# Patient Record
Sex: MALE | Race: WHITE | NOT HISPANIC OR LATINO | Employment: OTHER | URBAN - METROPOLITAN AREA
[De-identification: names, ages, dates, MRNs, and addresses within clinical notes are randomized per-mention and may not be internally consistent; named-entity substitution may affect disease eponyms.]

---

## 2021-01-01 ENCOUNTER — HOSPITAL ENCOUNTER (EMERGENCY)
Facility: HOSPITAL | Age: 76
End: 2021-02-21
Admitting: EMERGENCY MEDICINE
Payer: COMMERCIAL

## 2021-01-01 ENCOUNTER — APPOINTMENT (EMERGENCY)
Dept: RADIOLOGY | Facility: HOSPITAL | Age: 76
End: 2021-01-01
Payer: COMMERCIAL

## 2021-01-01 VITALS
OXYGEN SATURATION: 96 % | RESPIRATION RATE: 14 BRPM | HEART RATE: 62 BPM | DIASTOLIC BLOOD PRESSURE: 64 MMHG | SYSTOLIC BLOOD PRESSURE: 117 MMHG

## 2021-01-01 DIAGNOSIS — I21.9 ACUTE MI (HCC): ICD-10-CM

## 2021-01-01 DIAGNOSIS — I46.9 CARDIAC ARREST (HCC): Primary | ICD-10-CM

## 2021-01-01 LAB
ALBUMIN SERPL BCP-MCNC: 2.5 G/DL (ref 3.5–5)
ALP SERPL-CCNC: 87 U/L (ref 46–116)
ALT SERPL W P-5'-P-CCNC: 2059 U/L (ref 12–78)
ANION GAP SERPL CALCULATED.3IONS-SCNC: 16 MMOL/L (ref 4–13)
APTT PPP: 36 SECONDS (ref 23–37)
AST SERPL W P-5'-P-CCNC: 1663 U/L (ref 5–45)
BASOPHILS NFR MAR MANUAL: 0 % (ref 0–1)
BILIRUB SERPL-MCNC: 0.5 MG/DL (ref 0.2–1)
BUN SERPL-MCNC: 33 MG/DL (ref 5–25)
CA-I BLD-SCNC: 0.93 MMOL/L (ref 1.12–1.32)
CALCIUM ALBUM COR SERPL-MCNC: 9.4 MG/DL (ref 8.3–10.1)
CALCIUM SERPL-MCNC: 8.2 MG/DL (ref 8.3–10.1)
CHLORIDE SERPL-SCNC: 99 MMOL/L (ref 100–108)
CO2 SERPL-SCNC: 28 MMOL/L (ref 21–32)
CREAT SERPL-MCNC: 2.55 MG/DL (ref 0.6–1.3)
EOSINOPHIL NFR BLD MANUAL: 0 % (ref 0–6)
ERYTHROCYTE [DISTWIDTH] IN BLOOD BY AUTOMATED COUNT: 12.3 % (ref 11.6–15.1)
GFR SERPL CREATININE-BSD FRML MDRD: 14 ML/MIN/1.73SQ M
GLUCOSE SERPL-MCNC: 208 MG/DL (ref 65–140)
GLUCOSE SERPL-MCNC: 224 MG/DL (ref 65–140)
GLUCOSE SERPL-MCNC: 238 MG/DL (ref 65–140)
HCT VFR BLD AUTO: 44.5 % (ref 36.5–49.3)
HCT VFR BLD CALC: 42 % (ref 36.5–49.3)
HGB BLD-MCNC: 13.1 G/DL (ref 12–17)
HGB BLDA-MCNC: 14.3 G/DL (ref 12–17)
INR PPP: 1.91 (ref 0.84–1.19)
LACTATE SERPL-SCNC: 12.4 MMOL/L (ref 0.5–2)
LG PLATELETS BLD QL SMEAR: PRESENT
LYMPHOCYTES # BLD AUTO: 29 % (ref 14–44)
MACROCYTES BLD QL AUTO: PRESENT
MCH RBC QN AUTO: 30.5 PG (ref 26.8–34.3)
MCHC RBC AUTO-ENTMCNC: 29.4 G/DL (ref 31.4–37.4)
MCV RBC AUTO: 104 FL (ref 82–98)
MONOCYTES NFR BLD: 9 % (ref 4–12)
MYELOCYTES NFR BLD MANUAL: 2 % (ref 0–1)
NEUTS BAND NFR BLD MANUAL: 6 % (ref 0–8)
NEUTS SEG NFR BLD AUTO: 51 % (ref 43–75)
NRBC BLD AUTO-RTO: 1 /100 WBC (ref 0–2)
NRBC BLD AUTO-RTO: 1 /100 WBCS
PCO2 BLD: >103 MM HG (ref 42–50)
PH BLD: 6.95 [PH] (ref 7.3–7.4)
PLATELET # BLD AUTO: 135 THOUSANDS/UL (ref 149–390)
PLATELET BLD QL SMEAR: ADEQUATE
PMV BLD AUTO: 10.6 FL (ref 8.9–12.7)
PO2 BLD: 37 MM HG (ref 35–45)
POTASSIUM BLD-SCNC: 5.1 MMOL/L (ref 3.5–5.3)
POTASSIUM SERPL-SCNC: 5.2 MMOL/L (ref 3.5–5.3)
PROT SERPL-MCNC: 6.2 G/DL (ref 6.4–8.2)
PROTHROMBIN TIME: 21.8 SECONDS (ref 11.6–14.5)
RBC # BLD AUTO: 4.29 MILLION/UL (ref 3.88–5.62)
SODIUM BLD-SCNC: 136 MMOL/L (ref 136–145)
SODIUM SERPL-SCNC: 143 MMOL/L (ref 136–145)
SPECIMEN SOURCE: ABNORMAL
TOTAL CELLS COUNTED SPEC: 100
TROPONIN I SERPL-MCNC: 0.48 NG/ML
VARIANT LYMPHS # BLD AUTO: 3 %
WBC # BLD AUTO: 11.38 THOUSAND/UL (ref 4.31–10.16)

## 2021-01-01 PROCEDURE — 85007 BL SMEAR W/DIFF WBC COUNT: CPT | Performed by: PHYSICIAN ASSISTANT

## 2021-01-01 PROCEDURE — 82803 BLOOD GASES ANY COMBINATION: CPT

## 2021-01-01 PROCEDURE — 94002 VENT MGMT INPAT INIT DAY: CPT

## 2021-01-01 PROCEDURE — 36415 COLL VENOUS BLD VENIPUNCTURE: CPT | Performed by: PHYSICIAN ASSISTANT

## 2021-01-01 PROCEDURE — U0005 INFEC AGEN DETEC AMPLI PROBE: HCPCS | Performed by: EMERGENCY MEDICINE

## 2021-01-01 PROCEDURE — 85014 HEMATOCRIT: CPT

## 2021-01-01 PROCEDURE — 80053 COMPREHEN METABOLIC PANEL: CPT | Performed by: PHYSICIAN ASSISTANT

## 2021-01-01 PROCEDURE — 31500 INSERT EMERGENCY AIRWAY: CPT | Performed by: EMERGENCY MEDICINE

## 2021-01-01 PROCEDURE — 96375 TX/PRO/DX INJ NEW DRUG ADDON: CPT

## 2021-01-01 PROCEDURE — 82330 ASSAY OF CALCIUM: CPT

## 2021-01-01 PROCEDURE — 82947 ASSAY GLUCOSE BLOOD QUANT: CPT

## 2021-01-01 PROCEDURE — 94760 N-INVAS EAR/PLS OXIMETRY 1: CPT

## 2021-01-01 PROCEDURE — 99285 EMERGENCY DEPT VISIT HI MDM: CPT

## 2021-01-01 PROCEDURE — 96376 TX/PRO/DX INJ SAME DRUG ADON: CPT

## 2021-01-01 PROCEDURE — 84295 ASSAY OF SERUM SODIUM: CPT

## 2021-01-01 PROCEDURE — 84132 ASSAY OF SERUM POTASSIUM: CPT

## 2021-01-01 PROCEDURE — 96374 THER/PROPH/DIAG INJ IV PUSH: CPT

## 2021-01-01 PROCEDURE — 85027 COMPLETE CBC AUTOMATED: CPT | Performed by: PHYSICIAN ASSISTANT

## 2021-01-01 PROCEDURE — 85610 PROTHROMBIN TIME: CPT | Performed by: PHYSICIAN ASSISTANT

## 2021-01-01 PROCEDURE — 71045 X-RAY EXAM CHEST 1 VIEW: CPT

## 2021-01-01 PROCEDURE — 99291 CRITICAL CARE FIRST HOUR: CPT | Performed by: EMERGENCY MEDICINE

## 2021-01-01 PROCEDURE — 83605 ASSAY OF LACTIC ACID: CPT | Performed by: EMERGENCY MEDICINE

## 2021-01-01 PROCEDURE — 82948 REAGENT STRIP/BLOOD GLUCOSE: CPT

## 2021-01-01 PROCEDURE — 85730 THROMBOPLASTIN TIME PARTIAL: CPT | Performed by: PHYSICIAN ASSISTANT

## 2021-01-01 PROCEDURE — U0003 INFECTIOUS AGENT DETECTION BY NUCLEIC ACID (DNA OR RNA); SEVERE ACUTE RESPIRATORY SYNDROME CORONAVIRUS 2 (SARS-COV-2) (CORONAVIRUS DISEASE [COVID-19]), AMPLIFIED PROBE TECHNIQUE, MAKING USE OF HIGH THROUGHPUT TECHNOLOGIES AS DESCRIBED BY CMS-2020-01-R: HCPCS | Performed by: EMERGENCY MEDICINE

## 2021-01-01 PROCEDURE — 84484 ASSAY OF TROPONIN QUANT: CPT | Performed by: PHYSICIAN ASSISTANT

## 2021-01-01 PROCEDURE — 92950 HEART/LUNG RESUSCITATION CPR: CPT | Performed by: EMERGENCY MEDICINE

## 2021-01-01 RX ORDER — EPINEPHRINE 0.1 MG/ML
SYRINGE (ML) INJECTION CODE/TRAUMA/SEDATION MEDICATION
Status: COMPLETED | OUTPATIENT
Start: 2021-01-01 | End: 2021-01-01

## 2021-01-01 RX ORDER — AMIODARONE HYDROCHLORIDE 50 MG/ML
INJECTION, SOLUTION INTRAVENOUS CODE/TRAUMA/SEDATION MEDICATION
Status: COMPLETED | OUTPATIENT
Start: 2021-01-01 | End: 2021-01-01

## 2021-01-01 RX ADMIN — EPINEPHRINE 1 MG: 0.1 INJECTION INTRACARDIAC; INTRAVENOUS at 11:19

## 2021-01-01 RX ADMIN — EPINEPHRINE 1 MG: 0.1 INJECTION INTRACARDIAC; INTRAVENOUS at 10:44

## 2021-01-01 RX ADMIN — EPINEPHRINE 1 MG: 0.1 INJECTION INTRACARDIAC; INTRAVENOUS at 10:58

## 2021-01-01 RX ADMIN — AMIODARONE HYDROCHLORIDE 300 MG: 50 INJECTION, SOLUTION INTRAVENOUS at 11:16

## 2021-01-01 RX ADMIN — EPINEPHRINE 1 MG: 0.1 INJECTION INTRACARDIAC; INTRAVENOUS at 10:48

## 2021-02-21 NOTE — ED PROVIDER NOTES
History  Chief Complaint   Patient presents with    Cardiac Arrest     pt arrives intubated s/p cardiac arrest     68-year-old male was BIBA from home after witnessed loss of consciousness after using the toilet  Girlfriend noted that he was breathing heavily but he denied pain and declined any transfer to hospital   Once he became unresponsive she did call 911  She started CPR in the bed  Police arrived and apparently a ED did not advise shock  Continued CPR  Medics arrived found patient in asystole  They gave him several rounds epinephrine intravenously  Supraglottic airway was placed and patient bagged  Lukens chest compressor was deployed  Medics state that patient had 2 brief episodes of organized electrical activity with pulses  When he arrived he was asystolic and apneic  He had pale skin with circumoral cyanosis  Patient had continued compressions and received several rounds of IV epinephrine  He was intubated by me with endotracheal tube with indirect laryngoscopy  Placed on mechanical ventilator  Had a run of V-tach and received amiodarone  He went back to sinus rhythm briefly with good blood pressure  EKG at 11:03 a m  showed sinus rhythm with fusion complexes and premature atrial complexes  He had evidence of ST-elevation and MI in anterior leads with reciprocal changes inferolaterally  This slowly turned bradycardia and then pulseless as we were attempting to arrange transport to critical care at [facility]  Patient to continued to deteriorate over the next 20 minutes  Epinephrine drip was being prepared but it was decided after approximately 2 hours of CPR with poor responsiveness and lack of cardiac activity on bedside ultrasound to withdraw support  Patient was pronounced dead at 11:32 p m  Ivan Mccann Patient's brother and girlfriend came to the hospital afterwards  This state that he has no known medical problems  He did not drink alcohol regularly it was not a smoker  They said that it has been many years since he has been to a doctor  He took multiple OTC supplements including Cardio VH form [employer], DC Formula 217 water pills, CoQ10, Prostate Health, Black Elderberry, Vitamin D3 and OcuSuport     Family states that he had some upper respiratory symptoms for 1 week with sore throat but no fever cough  None       No past medical history on file  No past surgical history on file  No family history on file  I have reviewed and agree with the history as documented  No existing history information found  No existing history information found  Social History     Tobacco Use    Smoking status: Not on file   Substance Use Topics    Alcohol use: Not on file    Drug use: Not on file       Review of Systems   Unable to perform ROS: Patient unresponsive       Physical Exam  Physical Exam  Vitals signs and nursing note reviewed  Constitutional:       General: He is in acute distress  Appearance: He is obese  Comments: Apneic  Asystolic  Pupils fixed and dilated  Supraglottic airway in place  Lukens chest compression device in place  Peripheral IV access in left arm  HENT:      Head: Normocephalic and atraumatic  Right Ear: External ear normal       Left Ear: External ear normal       Nose: Nose normal    Eyes:      Comments: Pupils approximately 4 millimeter and fixed   Neck:      Musculoskeletal: No neck rigidity  Cardiovascular:      Comments: Asystolic  Pulmonary:      Comments: Apneic  Extremely diminished breath sounds with bagging supraglottic airway  Once orally intubated patient symmetric breath sounds  Abdominal:      General: There is distension  Palpations: There is no mass  Hernia: No hernia is present  Musculoskeletal:      Right lower leg: Edema present  Left lower leg: Edema present  Skin:     Coloration: Skin is pale     Neurological:      Comments: Comatose   Psychiatric:      Comments: Unresponsive         Vital Signs  ED Triage Vitals   Temp Pulse Respirations Blood Pressure SpO2   -- 02/21/21 1050 02/21/21 1104 02/21/21 1104 02/21/21 1050    (!) 114 12 (!) 153/106 96 %      Temp src Heart Rate Source Patient Position - Orthostatic VS BP Location FiO2 (%)   -- 02/21/21 1050 -- -- 02/21/21 1101    Monitor   100      Pain Score       --                  Vitals:    02/21/21 1050 02/21/21 1104 02/21/21 1114 02/21/21 1124   BP:  (!) 153/106  117/64   Pulse: (!) 114 91 (!) 109 62         Visual Acuity      ED Medications  Medications   EPINEPHrine (ADRENALIN) injection SOSY (1 mg Intravenous Given 2/21/21 1119)   amiodarone 150 mg/3 mL injection (300 mg Intravenous Given 2/21/21 1116)   EPINEPHrine (ADRENALIN) injection SOSY (1 mg Intravenous Given 2/21/21 1119)       Diagnostic Studies  Results Reviewed     Procedure Component Value Units Date/Time    Novel Coronavirus [facility] [[id number]] Collected: 02/21/21 1337    Lab Status: In process Specimen: Nares from Nasopharyngeal Swab Updated: 02/21/21 1343    CBC and differential [[id number]]  (Abnormal) Collected: 02/21/21 1055    Lab Status: Final result Specimen: Blood from Arm, Left Updated: 02/21/21 1337     WBC 11 38 Thousand/uL      RBC 4 29 Million/uL      Hemoglobin 13 1 g/dL      Hematocrit 44 5 %       fL      MCH 30 5 pg      MCHC 29 4 g/dL      RDW 12 3 %      MPV 10 6 fL      Platelets 949 Thousands/uL      nRBC 1 /100 WBCs     Narrative: This is an appended report  These results have been appended to a previously verified report      Manual Differential(PHLEBS Do Not Order) [[id number]]  (Abnormal) Collected: 02/21/21 1055    Lab Status: Final result Specimen: Blood from Arm, Left Updated: 02/21/21 1337     Segmented % 51 %      Bands % 6 %      Lymphocytes % 29 %      Monocytes % 9 %      Eosinophils, % 0 %      Basophils % 0 %      Myelocytes % 2 %      Atypical Lymphocytes % 3 %      Total Counted 100     nRBC 1 /100 WBC      Macrocytes Present     Platelet Estimate Adequate     Large Platelet Present    Troponin I [[id number]]  (Abnormal) Collected: 02/21/21 1055    Lab Status: Final result Specimen: Blood from Arm, Left Updated: 02/21/21 1200     Troponin I 0 48 ng/mL     POCT Blood Gas (CG8+) [[id number]]  (Abnormal) Collected: 02/21/21 1056    Lab Status: Final result Specimen: Venous Updated: 02/21/21 1141     ph, Jong ISTAT 6 953     pCO2, Jong i-STAT >103 0 mm HG      pO2, Jong i-STAT 37 0 mm HG      BE, i-STAT --     HCO3, Jong i-STAT --     CO2, i-STAT --     O2 Sat, i-STAT --     SODIUM, I-STAT 136 mmol/l      Potassium, i-STAT 5 1 mmol/L      Calcium, Ionized i-STAT 0 93 mmol/L      Hct, i-STAT 42 %      Hgb, i-STAT 14 3 g/dl      Glucose, i-STAT 224 mg/dl      Specimen Type VENOUS    Lactic acid, plasma [[id number]]  (Abnormal) Collected: 02/21/21 1058    Lab Status: Final result Specimen: Blood from Arm, Left Updated: 02/21/21 1140     LACTIC ACID 12 4 mmol/L     Narrative:      Result may be elevated if tourniquet was used during collection      Comprehensive metabolic panel [[id number]]  (Abnormal) Collected: 02/21/21 1055    Lab Status: Final result Specimen: Blood from Arm, Left Updated: 02/21/21 1138     Sodium 143 mmol/L      Potassium 5 2 mmol/L      Chloride 99 mmol/L      CO2 28 mmol/L      ANION GAP 16 mmol/L      BUN 33 mg/dL      Creatinine 2 55 mg/dL      Glucose 238 mg/dL      Calcium 8 2 mg/dL      Corrected Calcium 9 4 mg/dL      AST 1,663 U/L      ALT 2,059 U/L      Alkaline Phosphatase 87 U/L      Total Protein 6 2 g/dL      Albumin 2 5 g/dL      Total Bilirubin 0 50 mg/dL      eGFR 14 ml/min/1 73sq m     Narrative:      Diego guidelines for Chronic Kidney Disease (CKD):     Stage 1 with normal or high GFR (GFR > 90 mL/min/1 73 square meters)    Stage 2 Mild CKD (GFR = 60-89 mL/min/1 73 square meters)    Stage 3A Moderate CKD (GFR = 45-59 mL/min/1 73 square meters)   Stage 3B Moderate CKD (GFR = 30-44 mL/min/1 73 square meters)    Stage 4 Severe CKD (GFR = 15-29 mL/min/1 73 square meters)    Stage 5 End Stage CKD (GFR <15 mL/min/1 73 square meters)  Note: GFR calculation is accurate only with a steady state creatinine    Protime-INR [[id number]]  (Abnormal) Collected: 02/21/21 1055    Lab Status: Final result Specimen: Blood from Arm, Left Updated: 02/21/21 1129     Protime 21 8 seconds      INR 1 91    APTT [[id number]]  (Normal) Collected: 02/21/21 1055    Lab Status: Final result Specimen: Blood from Arm, Left Updated: 02/21/21 1129     PTT 36 seconds     Fingerstick Glucose (POCT) [[id number]]  (Abnormal) Collected: 02/21/21 1056    Lab Status: Final result Updated: 02/21/21 1057     POC Glucose 208 mg/dl                  XR chest 1 view portable    (Results Pending)              Procedures  ECG 12 Lead Documentation Only    Date/Time: 2/21/2021 11:05 AM  Performed by: Tu Leblanc DO  Authorized by: Tu Leblanc DO     ECG reviewed by me, the ED Provider: yes    Patient location:  ED  Previous ECG:     Previous ECG:  Unavailable  Rhythm:     Rhythm: sinus rhythm    Ectopy:     Ectopy: none      Intubation    Date/Time: 2/21/2021 10:50 AM  Performed by: Tu Leblanc DO  Authorized by: Tu Leblanc DO     Patient location:  ED  Other Assisting Provider: Yes (comment) RT Rahel)    Consent:     Consent obtained:  Emergent situation  Universal protocol:     Procedure explained and questions answered to patient or proxy's satisfaction: no      Relevant documents present and verified: no      Test results available and properly labeled: no      Radiology Images displayed and confirmed  If images not available, report reviewed: no    Pre-procedure details:     Patient status:  Unresponsive    ASA status: Supraglottic airway in place      Pretreatment medications:  None    Paralytics:  None  Indications:     Indications for intubation: respiratory failure, airway protection, hypoxemia, hypercapnia and pulmonary toilet    Procedure details:     Preoxygenation:  AJ/LMA    CPR in progress: yes      Intubation method:  Oral    Oral intubation technique:  Glidescope    Laryngoscope blade: Mac 5    Tube size (mm):  8 0    Tube type:  Hi-lo    Number of attempts:  1    Cricoid pressure: no      Tube visualized through cords: yes    Placement assessment:     ETT to teeth:  23    Tube secured with:  ETT mota    Breath sounds:  Equal    Placement verification: chest rise, condensation, CXR verification, direct visualization, equal breath sounds and ETCO2 detector      Chest x-ray findings: At tracheal lisbet  Tube withdrawn 2 cm  Post-procedure details:     Patient tolerance of procedure: Tolerated well, no immediate complications    Complication (if applicable): With true ET tube 2 centimeters after noting it stepped on chest x-ray and auscultating decreased breath sounds on the left    CriticalCare Time  Performed by: Raheem Murray DO  Authorized by: Raheem Murray DO     Critical care provider statement:     Critical care time (minutes):  55    Critical care time was exclusive of:  Separately billable procedures and treating other patients and teaching time    Critical care was necessary to treat or prevent imminent or life-threatening deterioration of the following conditions:  Cardiac failure, respiratory failure and circulatory failure    Critical care was time spent personally by me on the following activities:  Obtaining history from patient or surrogate, development of treatment plan with patient or surrogate, discussions with consultants, evaluation of patient's response to treatment, examination of patient, ordering and review of laboratory studies, ordering and review of radiographic studies, re-evaluation of patient's condition and ventilator management    I assumed direction of critical care for this patient from another provider in my specialty: no ED Course  ED Course as of 1658   Sun 2021   1311 Spoke to the patient's brother and girlfriend  Two other friends were in the room with us  He states that he has not seen a doctor for probably several years  He has no known medical history will never go to the hospital a said  Coronary was contacted  I await his call back  Case discussed with the corner  Corner wants further testing done including COVID swab which I ordered  This week 's case at this time  I will pronounce the patient  see code sheet for ACLS sequences                                  MDM  Number of Diagnoses or Management Options  Acute MI [facility]):   Cardiac arrest [facility]):   Diagnosis management comments: Elderly man with no known history comes in 1 hour after cardiac arrest with CPR  Asystolic on arrival   Continue to perform ACLS interventions for another approximately 1 hour  Discussed case with Critical Care at [facility]  Due to lack myocardial contractility at an this time and with fixed dilated pupils co was terminated patient pronounced dead at 11:39 a m  Linwooddavid Baker Case discussed with jyothi who wants to keep it was a 's case at this time         Amount and/or Complexity of Data Reviewed  Clinical lab tests: ordered and reviewed  Tests in the radiology section of CPT®: ordered and reviewed  Discuss the patient with other providers: yes  Independent visualization of images, tracings, or specimens: yes        Disposition  Final diagnoses:   Cardiac arrest ([address] )   Acute MI ([address] )     Time reflects when diagnosis was documented in both MDM as applicable and the Disposition within this note     Time User Action Codes Description Comment    2021 11:14 AM Homer Marquez Add [I46 9] Cardiac arrest ([address] )     2021 11:14 AM Homer Alma Add [I21 9] Acute MI [facility])       ED Disposition     ED Disposition Condition Date/Time Comment      Sun 2021 12:58 PM         Follow-up Information    None         There are no discharge medications for this patient  No discharge procedures on file      PDMP Review     None          ED Provider  Electronically Signed by           Francisca Echeverria DO  02/21/21 8067

## 2021-02-22 LAB — SARS-COV-2 RNA RESP QL NAA+PROBE: NEGATIVE
